# Patient Record
Sex: FEMALE | Race: WHITE | Employment: UNEMPLOYED | URBAN - METROPOLITAN AREA
[De-identification: names, ages, dates, MRNs, and addresses within clinical notes are randomized per-mention and may not be internally consistent; named-entity substitution may affect disease eponyms.]

---

## 2022-09-17 ENCOUNTER — HOSPITAL ENCOUNTER (EMERGENCY)
Age: 32
Discharge: HOME | End: 2022-09-17
Attending: EMERGENCY MEDICINE
Payer: COMMERCIAL

## 2022-09-17 VITALS
RESPIRATION RATE: 22 BRPM | BODY MASS INDEX: 25.12 KG/M2 | OXYGEN SATURATION: 98 % | TEMPERATURE: 98.5 F | HEART RATE: 75 BPM | DIASTOLIC BLOOD PRESSURE: 60 MMHG | WEIGHT: 141.76 LBS | HEIGHT: 63 IN | SYSTOLIC BLOOD PRESSURE: 125 MMHG

## 2022-09-17 DIAGNOSIS — K91.840 SURGICAL WOUND HEMORRHAGE AFTER DENTAL PROCEDURE: Primary | ICD-10-CM

## 2022-09-17 DIAGNOSIS — Z98.818 SURGICAL WOUND HEMORRHAGE AFTER DENTAL PROCEDURE: Primary | ICD-10-CM

## 2022-09-17 PROCEDURE — 6360000002 HC RX W HCPCS: Performed by: EMERGENCY MEDICINE

## 2022-09-17 PROCEDURE — 99284 EMERGENCY DEPT VISIT MOD MDM: CPT

## 2022-09-17 PROCEDURE — 96372 THER/PROPH/DIAG INJ SC/IM: CPT

## 2022-09-17 PROCEDURE — 6370000000 HC RX 637 (ALT 250 FOR IP): Performed by: EMERGENCY MEDICINE

## 2022-09-17 PROCEDURE — 2500000003 HC RX 250 WO HCPCS: Performed by: EMERGENCY MEDICINE

## 2022-09-17 RX ORDER — TRANEXAMIC ACID 100 MG/ML
15 INJECTION, SOLUTION INTRAVENOUS ONCE
Status: COMPLETED | OUTPATIENT
Start: 2022-09-17 | End: 2022-09-17

## 2022-09-17 RX ORDER — LIDOCAINE HYDROCHLORIDE 20 MG/ML
15 SOLUTION OROPHARYNGEAL ONCE
Status: COMPLETED | OUTPATIENT
Start: 2022-09-17 | End: 2022-09-17

## 2022-09-17 RX ORDER — LIDOCAINE HYDROCHLORIDE 20 MG/ML
JELLY TOPICAL ONCE
Status: DISCONTINUED | OUTPATIENT
Start: 2022-09-17 | End: 2022-09-17

## 2022-09-17 RX ADMIN — LIDOCAINE HYDROCHLORIDE 15 ML: 20 SOLUTION ORAL; TOPICAL at 13:15

## 2022-09-17 RX ADMIN — HYDROMORPHONE HYDROCHLORIDE 2 MG: 1 INJECTION, SOLUTION INTRAMUSCULAR; INTRAVENOUS; SUBCUTANEOUS at 14:00

## 2022-09-17 RX ADMIN — TRANEXAMIC ACID 965 MG: 100 INJECTION, SOLUTION INTRAVENOUS at 14:28

## 2022-09-17 ASSESSMENT — PAIN SCALES - GENERAL
PAINLEVEL_OUTOF10: 5
PAINLEVEL_OUTOF10: 5
PAINLEVEL_OUTOF10: 8
PAINLEVEL_OUTOF10: 4
PAINLEVEL_OUTOF10: 9

## 2022-09-17 ASSESSMENT — PAIN DESCRIPTION - LOCATION
LOCATION: JAW
LOCATION: TEETH

## 2022-09-17 ASSESSMENT — PAIN - FUNCTIONAL ASSESSMENT
PAIN_FUNCTIONAL_ASSESSMENT: 0-10
PAIN_FUNCTIONAL_ASSESSMENT: 0-10

## 2022-09-17 ASSESSMENT — PAIN DESCRIPTION - ORIENTATION
ORIENTATION: LEFT;LOWER;POSTERIOR
ORIENTATION: LEFT

## 2022-09-17 ASSESSMENT — PAIN DESCRIPTION - DESCRIPTORS: DESCRIPTORS: ACHING

## 2022-09-17 NOTE — ED NOTES
This RN to bedside to administer pain medication as ordered. David Lockett RN at bedside as chaperone. Patient reports she does have a ride available and will not be driving home. Patient placed on SPO2 and blood pressure monitoring post administration. Of Note: Prior to administration, noted that this RN only pulled one mg of dilaudid in error instead of 2mg as ordered. David Lockett RN and this RN to [facility] to dispense correct amount. Dispensation witnessed by [employer].       Hi Garcia, FLORINA  09/17/22 0516

## 2022-09-17 NOTE — ED NOTES
Lidocaine jelly out of stock at this facility. Order placed by MD for viscous lidocaine as substitute. Viscous lidocaine pulled and provided to MD at this time.      Marina Boss RN  09/17/22 4164

## 2022-09-17 NOTE — ED TRIAGE NOTES
Patient reports having two left molar teeth pulled yesterday at a clinic in [facility]. Patient states she woke up this AM with blood on pillow and bed, reports difficulty getting bleeding to stop since. Patient states she was unable to reach anyone at the office. Reports 5 weeks postpartum from uncomplicated vaginal birth. Active bleeding noted on arrival. Patient provided with emesis bag and regular sterile gauze. Patient resting on bed, respirations even and easy at this time. No obvious distress.

## 2022-09-17 NOTE — ED NOTES
Pt d/c home with AVS no s/s of distress noted, she denies questions about f/u paperwork for PCP, dental clinic, and S. W provided      Chandni Wright RN  09/17/22 7271

## 2022-09-17 NOTE — Clinical Note
Tao Lui was seen and treated in our emergency department on 9/17/2022. She may return to work on 09/20/2022. As directed by her follow-up doctor     If you have any questions or concerns, please don't hesitate to call.       Angela Alex, DO

## 2022-09-17 NOTE — ED PROVIDER NOTES
[facility] ENCOUNTER      Pt Name: Jaguar Ruvalcaba  MRN: [id number]  [place] 1990  Date of evaluation: 9/17/2022  Provider: Sharon Moya, [address] [place] [address]  Chief Complaint   Patient presents with    Post-op Problem     Patient reports having two left molar teeth pulled yesterday at a clinic in [facility]. Patient states she woke up this AM with blood on pillow and bed, reports difficulty getting bleeding to stop since. Patient states she was unable to reach anyone at the office. Reports 5 weeks postpartum from uncomplicated vaginal birth. I wore personal protective equipment when I was in the room the entire time. This includes gloves, N95 mask, face shield, and a glove over my stethoscope for protection. HPI  Jaguar Ruvalcaba is a 28 y.o. female who presents with a having a tooth extraction yesterday. Is not stopped bleeding since that time. She states she is placed pressure on it and bit down on gauze and has not been able get to stop bleeding. She had 2 teeth extracted on the lower left. She denies being on blood thinners. She denies any other complaints. She has had pain in her jaw for secondary to biting down for so long. REVIEW OF SYSTEMS  All systems negative except as noted in the HPI. Reviewed Nurses' notes and concur. No LMP recorded. PAST MEDICAL HISTORY  History reviewed. No pertinent past medical history. FAMILY HISTORY  History reviewed. No pertinent family history. SOCIAL HISTORY   reports that she has been smoking cigarettes. She has been smoking an average of .5 packs per day. She has never used smokeless tobacco. She reports current alcohol use. She reports that she does not currently use drugs after having used the following drugs: Marijuana Estil Catena). Frequency: 2.00 times per week.     SURGICAL HISTORY  Past Surgical History:   Procedure Laterality Date    TONSILLECTOMY         CURRENT MEDICATIONS      ALLERGIES  No Known Allergies    Tetanus vaccination status reviewed: tetanus re-vaccination not indicated. PHYSICAL EXAM  VITAL SIGNS: /84   Pulse 75   Temp 98.5 °F (36.9 °C) (Oral)   Resp 22   Ht 5' 3\" (1.6 m)   Wt 141 lb 12.1 oz (64.3 kg)   SpO2 98%   Breastfeeding No   BMI 25.11 kg/m²   Constitutional: Well-developed, well-nourished, appears normal, nontoxic, activity: Resting comfortably on the cart, no obvious pain, speaking full sentences, does not appear ill or toxic. Appears in mild pain. HENT: Normocephalic, Atraumatic, Bilateral external ears normal, Oropharynx moist, No oral exudates, 2 dental extractions are noted over 18 and 19, no dental tenderness is noted, there is tenderness over the sockets. There is mild oozing of blood noted from socket #19. This is mild oozing. No drainage or evidence of infection is noted. Minimal gingival swelling, minimal gingival erythema, no gingival abcess, no broken teeth, Nose normal.  Eyes: PERRLA, EOMI, Conjunctiva normal, No discharge. No scleral icterus. Neck: Normal range of motion, No tenderness, Supple, no adenopathy  Lymphatic: No lymphadenopathy noted. Cardiovascular: Normal heart rate,   Thorax & Lungs: Normal breath sounds, No respiratory distress, No wheezing,  Psychiatric: Affect normal, Judgment normal, Mood normal.    I attempted to stop the bleeding with Surgicel applied to both dental sockets for 30 minutes without success. Therefore, the gingiva was anesthetized with lidocaine viscus because we had no lidocaine jelly here. I then injected the gum with lidocaine 1% plain which seemed to slow the bleeding down some. However, still continued to bleed. Therefore, I applied 2 sutures of 5-0 chromic across the socket that was bleeding (#19). The bleeding has slowed tremendously. [place] at [id number]. At 1415, the socket was still oozing blood.   Therefore, I applied transischemic acid on a soaked gauze and irrigating the socket with transischemic acid. Patient was observed for 30 minutes and was reexamined at 1505. Reexamination at 97 70 84 revealed the bleeding to be controlled at this time. I continue to watch the patient for another half hour to make sure the bleeding did not recur. Reexamination at 66 91 21 revealed minimal bleeding. Therefore the remainder of the tranexamic acid was placed on a 2 x 2 and patient was discharged with tranexamic acid in place and instructed to remove it in 30 to 60 minutes. She was instructed that if she had any more problems she should go to the [facility] where they have maxillofacial surgeons or dental residents on call. COURSE & MEDICAL DECISION MAKING      Vitals:    09/17/22 1330 09/17/22 1400 09/17/22 1415 09/17/22 1430   BP: 117/88  (!) 123/90 116/84   Pulse: 75      Resp: 22      Temp:       TempSrc:       SpO2: 100% 100%  98%   Weight:       Height:           Medications   lidocaine viscous hcl (XYLOCAINE) 2 % solution 15 mL (15 mLs Mouth/Throat Given by Other 9/17/22 1315)   HYDROmorphone (DILAUDID) injection 2 mg (2 mg IntraMUSCular Given 9/17/22 1400)   tranexamic acid (CYKLOKAPRON) injection 965 mg (965 mg IntraVENous Given by Other 9/17/22 1422)       New Prescriptions    No medications on file       Patient remained stable in the ED. The patient's blood pressure was found to be elevated according to CMS/Medicare and the Affordable Care Act/ObamaCare criteria. Elevated blood pressure could occur because of pain or anxiety or other reasons and does not mean that they need to have their blood pressure treated or medications otherwise adjusted. However, this could also be a sign that they will need to have their blood pressure treated or medications changed. The patient was instructed to follow up closely with their personal physician to have their blood pressure rechecked.  The patient was instructed to take a list of recent blood pressure readings to their next visit with their personal physician. See discharge instructions for specific medications, discharge information, and treatments. They were verbally instructed to return to emergency if any problems. (This chart has been completed using [address]. Although attempts have been made to ensure accuracy, words and/or phrases may not be transcribed as intended.)    Patient refused pain medicines at the time of their exam.  But requested after,. Epilated several times. IMPRESSION(S):  1. Surgical wound hemorrhage after dental procedure        ? Recheck Times: 1330, 1350, 1415, 1430, 1505, 1540    Diagnostic considerations include but are not limited to:  Dental Abscess, Airway Obstruction, Erik's Angina, Bacteremia/Sepsis, coagulopathy,.        Ganesh Garcia,   09/17/22 2868

## 2022-09-17 NOTE — DISCHARGE INSTRUCTIONS
You may take Tylenol (acetaminophen) with the medications that are prescribed for you, if you are permitted to take these medications. Please follow package directions for the appropriate dosing and frequency.

## 2022-09-17 NOTE — ED NOTES
Patient reports it does not feel as though the bleeding has slowed. Patient reports increased jaw pain due to holding pressure on gauze. MD requests patient be moved to room with surgical light for suture placement. Patient roomed to room 7.      Irene Chan RN  09/17/22 4884